# Patient Record
Sex: MALE | Race: OTHER | NOT HISPANIC OR LATINO | URBAN - METROPOLITAN AREA
[De-identification: names, ages, dates, MRNs, and addresses within clinical notes are randomized per-mention and may not be internally consistent; named-entity substitution may affect disease eponyms.]

---

## 2024-02-07 ENCOUNTER — EMERGENCY (EMERGENCY)
Facility: HOSPITAL | Age: 39
LOS: 1 days | Discharge: HOME | End: 2024-02-07
Admitting: EMERGENCY MEDICINE
Payer: MEDICAID

## 2024-02-07 VITALS
DIASTOLIC BLOOD PRESSURE: 87 MMHG | SYSTOLIC BLOOD PRESSURE: 150 MMHG | OXYGEN SATURATION: 97 % | TEMPERATURE: 98 F | HEART RATE: 77 BPM | RESPIRATION RATE: 16 BRPM

## 2024-02-07 VITALS
SYSTOLIC BLOOD PRESSURE: 160 MMHG | HEART RATE: 98 BPM | HEIGHT: 69 IN | TEMPERATURE: 98 F | DIASTOLIC BLOOD PRESSURE: 109 MMHG | WEIGHT: 175.05 LBS | OXYGEN SATURATION: 99 % | RESPIRATION RATE: 18 BRPM

## 2024-02-07 DIAGNOSIS — I10 ESSENTIAL (PRIMARY) HYPERTENSION: ICD-10-CM

## 2024-02-07 DIAGNOSIS — F41.9 ANXIETY DISORDER, UNSPECIFIED: ICD-10-CM

## 2024-02-07 PROCEDURE — 99284 EMERGENCY DEPT VISIT MOD MDM: CPT

## 2024-02-07 RX ORDER — HYDROXYZINE HCL 10 MG
25 TABLET ORAL ONCE
Refills: 0 | Status: COMPLETED | OUTPATIENT
Start: 2024-02-07 | End: 2024-02-07

## 2024-02-07 RX ORDER — LOSARTAN POTASSIUM 100 MG/1
50 TABLET, FILM COATED ORAL DAILY
Refills: 0 | Status: DISCONTINUED | OUTPATIENT
Start: 2024-02-07 | End: 2024-02-11

## 2024-02-07 RX ADMIN — Medication 25 MILLIGRAM(S): at 12:59

## 2024-02-07 RX ADMIN — LOSARTAN POTASSIUM 50 MILLIGRAM(S): 100 TABLET, FILM COATED ORAL at 12:59

## 2024-02-07 NOTE — ED PROVIDER NOTE - PROGRESS NOTE DETAILS
His pharmacy.  Patient currently is on I was able to verify patient's medications after calling losartan/hydrochlorothiazide 50/12.5 mg.  He also takes hydroxyzine as needed at bedtime.  Patient given all doses of medications here.  He continues to remain asymptomatic.  Repeat blood pressure taken at a later time noted to be 150/87.  Will plan to discharge patient and recommend PMD follow-up when he is he is out of police custody.  Return precautions discussed and patient stable as he leaves.

## 2024-02-07 NOTE — ED PROVIDER NOTE - PATIENT PORTAL LINK FT
You can access the FollowMyHealth Patient Portal offered by [facility] by registering at the following website: [email or web address]. By joining [facility]’s FollowMyHealth portal, you will also be able to view your health information using other applications (apps) compatible with our system.

## 2024-02-07 NOTE — ED ADULT NURSE NOTE - NSFALLUNIVINTERV_ED_ALL_ED
Bed/Stretcher in lowest position, wheels locked, appropriate side rails in place/Call bell, personal items and telephone in reach/Instruct patient to call for assistance before getting out of bed/chair/stretcher/Non-slip footwear applied when patient is off stretcher/[place] to call system/Physically safe environment - no spills, clutter or unnecessary equipment/Purposeful proactive rounding/Room/bathroom lighting operational, light cord in reach

## 2024-02-07 NOTE — ED PROVIDER NOTE - OBJECTIVE STATEMENT
38-year-old, past medical history of hypertension and anxiety, presenting to the emergency room, currently in police custody, requesting his blood pressure medications.  Patient states he has not taken his meds since Tuesday morning but currently is under arrest and will not be able to access the rest of his medications.  He currently denies any headache, changes in vision, chest pain, shortness of breath, nausea or vomiting.  Patient states he does feel a bit anxious and was unable to take his hydroxyzine last night due to being in police custody.  He is requesting a dose of it now.  Patient does not recall the specific names and doses of all of his meds.

## 2024-02-07 NOTE — ED ADULT NURSE NOTE - OBJECTIVE STATEMENT
patient aox3, breathing even and unlabored on RA c/o hypertension. denies chest pain. patient under arrest, accompanied by [employer]. pt calm and cooperative at this time.

## 2024-02-07 NOTE — ED PROVIDER NOTE - PHYSICAL EXAMINATION
VITAL SIGNS: I have reviewed nursing notes and confirm.  CONSTITUTIONAL: Well-developed; well-nourished; in no acute distress.  SKIN: No acute rash.  HEAD: Normocephalic; atraumatic.  CARD: No extremity cyanosis. RRR, S1S2.  RESP: Speaks in full, clear sentences. CTA terry. No adventitious BS.  EXT: Moves all extremities normally.  NEURO: Alert, oriented. Grossly unremarkable. No focal deficits. Fluent speech.   PSYCH: Cooperative, appropriate. Mood and affect wnl.

## 2024-02-07 NOTE — ED ADULT NURSE NOTE - CHIEF COMPLAINT QUOTE
Pt BIBA from [facility] for headache and hypertension. Per EMS,  on scene, now 160/109. Pt states he has not taken anti-hypertensives since Tuesday AM. Pt denies blurred vision and denies N/V. PO Mahendra (#[id number]) from [facility] accompanying.

## 2024-02-07 NOTE — ED PROVIDER NOTE - CLINICAL SUMMARY MEDICAL DECISION MAKING FREE TEXT BOX
38-year-old, past medical history of hypertension and anxiety, presenting to the emergency room, currently in police custody, requesting his blood pressure medications. Patient is noted to have an elevated blood pressure in triage, 160/109.  Physical exam is within normal limits and EKG shows normal sinus rhythm.  Patient does not have any active complaints, will hold on meds at this time.  He likely has an elevated blood pressure due to not having his medications.  Will plan to verify patient's med dose from his pharmacy and will give here.  Will reassess and dispo pending medical workup.

## 2024-02-07 NOTE — ED PROVIDER NOTE - NS ED ROS FT
+mild anxiety  Denies fevers, chills, nausea, vomiting, diarrhea, constipation, abdominal pain, urinary symptoms, chest pain, palpitations, shortness of breath, dyspnea on exertion, syncope/near syncope, cough/URI symptoms, headache, weakness, numbness, focal deficits, visual changes, gait or balance changes, dizziness

## 2024-02-07 NOTE — ED ADULT TRIAGE NOTE - CHIEF COMPLAINT QUOTE
[facility] for headache and hypertension. Per EMS,  on scene, now 160/109. Pt states he has not taken anti-hypertensives since Tuesday AM. Pt denies blurred vision and denies N/V. PO Mahendra (#[id number]) from [facility] accompanying.

## 2024-02-07 NOTE — ED ADULT TRIAGE NOTE - BEFAST SCREENING
Remote reviewed. Patient is monitored for syncope. One episode of ST. Will continue to monitor. Negative